# Patient Record
Sex: MALE | Employment: OTHER | URBAN - METROPOLITAN AREA
[De-identification: names, ages, dates, MRNs, and addresses within clinical notes are randomized per-mention and may not be internally consistent; named-entity substitution may affect disease eponyms.]

---

## 2022-01-12 ENCOUNTER — HOSPITAL ENCOUNTER (EMERGENCY)
Age: 62
Discharge: HOME | End: 2022-01-12
Attending: EMERGENCY MEDICINE
Payer: OTHER GOVERNMENT

## 2022-01-12 ENCOUNTER — APPOINTMENT (OUTPATIENT)
Dept: CT IMAGING | Age: 62
End: 2022-01-12
Payer: OTHER GOVERNMENT

## 2022-01-12 VITALS
RESPIRATION RATE: 19 BRPM | BODY MASS INDEX: 23.9 KG/M2 | OXYGEN SATURATION: 99 % | TEMPERATURE: 98.8 F | WEIGHT: 140 LBS | SYSTOLIC BLOOD PRESSURE: 138 MMHG | DIASTOLIC BLOOD PRESSURE: 87 MMHG | HEIGHT: 64 IN | HEART RATE: 86 BPM

## 2022-01-12 DIAGNOSIS — N20.0 KIDNEY STONE: Primary | ICD-10-CM

## 2022-01-12 LAB
ALBUMIN SERPL-MCNC: 5.2 G/DL (ref 3.5–5.2)
ALP BLD-CCNC: 69 U/L (ref 40–129)
ALT SERPL-CCNC: 16 U/L (ref 0–40)
ANION GAP SERPL CALCULATED.3IONS-SCNC: 14 MMOL/L (ref 7–16)
AST SERPL-CCNC: 21 U/L (ref 0–39)
BACTERIA: ABNORMAL /HPF
BASOPHILS ABSOLUTE: 0.11 E9/L (ref 0–0.2)
BASOPHILS RELATIVE PERCENT: 0.6 % (ref 0–2)
BILIRUB SERPL-MCNC: 0.5 MG/DL (ref 0–1.2)
BILIRUBIN URINE: NEGATIVE
BLOOD, URINE: ABNORMAL
BUN BLDV-MCNC: 15 MG/DL (ref 6–23)
CALCIUM SERPL-MCNC: 10 MG/DL (ref 8.6–10.2)
CHLORIDE BLD-SCNC: 105 MMOL/L (ref 98–107)
CLARITY: CLEAR
CO2: 25 MMOL/L (ref 22–29)
COLOR: YELLOW
CREAT SERPL-MCNC: 1 MG/DL (ref 0.7–1.2)
EOSINOPHILS ABSOLUTE: 0.17 E9/L (ref 0.05–0.5)
EOSINOPHILS RELATIVE PERCENT: 1 % (ref 0–6)
GFR AFRICAN AMERICAN: >60
GFR NON-AFRICAN AMERICAN: >60 ML/MIN/1.73
GLUCOSE BLD-MCNC: 159 MG/DL (ref 74–99)
GLUCOSE URINE: NEGATIVE MG/DL
HCT VFR BLD CALC: 49.9 % (ref 37–54)
HEMOGLOBIN: 16.9 G/DL (ref 12.5–16.5)
IMMATURE GRANULOCYTES #: 0.1 E9/L
IMMATURE GRANULOCYTES %: 0.6 % (ref 0–5)
KETONES, URINE: NEGATIVE MG/DL
LEUKOCYTE ESTERASE, URINE: ABNORMAL
LIPASE: 37 U/L (ref 13–60)
LYMPHOCYTES ABSOLUTE: 1.39 E9/L (ref 1.5–4)
LYMPHOCYTES RELATIVE PERCENT: 8 % (ref 20–42)
MCH RBC QN AUTO: 29.4 PG (ref 26–35)
MCHC RBC AUTO-ENTMCNC: 33.9 % (ref 32–34.5)
MCV RBC AUTO: 86.9 FL (ref 80–99.9)
MONOCYTES ABSOLUTE: 0.72 E9/L (ref 0.1–0.95)
MONOCYTES RELATIVE PERCENT: 4.1 % (ref 2–12)
NEUTROPHILS ABSOLUTE: 14.86 E9/L (ref 1.8–7.3)
NEUTROPHILS RELATIVE PERCENT: 85.7 % (ref 43–80)
NITRITE, URINE: NEGATIVE
PDW BLD-RTO: 12.6 FL (ref 11.5–15)
PH UA: 6 (ref 5–9)
PLATELET # BLD: 269 E9/L (ref 130–450)
PMV BLD AUTO: 9.3 FL (ref 7–12)
POTASSIUM SERPL-SCNC: 4.3 MMOL/L (ref 3.5–5)
PROTEIN UA: NEGATIVE MG/DL
RBC # BLD: 5.74 E12/L (ref 3.8–5.8)
RBC UA: ABNORMAL /HPF (ref 0–2)
REASON FOR REJECTION: NORMAL
REJECTED TEST: NORMAL
SODIUM BLD-SCNC: 144 MMOL/L (ref 132–146)
SPECIFIC GRAVITY UA: >=1.03 (ref 1–1.03)
TOTAL PROTEIN: 8.3 G/DL (ref 6.4–8.3)
UROBILINOGEN, URINE: 0.2 E.U./DL
WBC # BLD: 17.4 E9/L (ref 4.5–11.5)
WBC UA: ABNORMAL /HPF (ref 0–5)

## 2022-01-12 PROCEDURE — 80053 COMPREHEN METABOLIC PANEL: CPT

## 2022-01-12 PROCEDURE — 6360000002 HC RX W HCPCS

## 2022-01-12 PROCEDURE — 74178 CT ABD&PLV WO CNTR FLWD CNTR: CPT

## 2022-01-12 PROCEDURE — 83690 ASSAY OF LIPASE: CPT

## 2022-01-12 PROCEDURE — 85025 COMPLETE CBC W/AUTO DIFF WBC: CPT

## 2022-01-12 PROCEDURE — 2580000003 HC RX 258: Performed by: RADIOLOGY

## 2022-01-12 PROCEDURE — 99284 EMERGENCY DEPT VISIT MOD MDM: CPT

## 2022-01-12 PROCEDURE — 6360000004 HC RX CONTRAST MEDICATION: Performed by: RADIOLOGY

## 2022-01-12 PROCEDURE — 6360000002 HC RX W HCPCS: Performed by: EMERGENCY MEDICINE

## 2022-01-12 PROCEDURE — 96375 TX/PRO/DX INJ NEW DRUG ADDON: CPT

## 2022-01-12 PROCEDURE — 96374 THER/PROPH/DIAG INJ IV PUSH: CPT

## 2022-01-12 PROCEDURE — 81001 URINALYSIS AUTO W/SCOPE: CPT

## 2022-01-12 RX ORDER — TAMSULOSIN HYDROCHLORIDE 0.4 MG/1
0.4 CAPSULE ORAL DAILY
Qty: 14 CAPSULE | Refills: 0 | Status: SHIPPED | OUTPATIENT
Start: 2022-01-12

## 2022-01-12 RX ORDER — MORPHINE SULFATE 2 MG/ML
4 INJECTION, SOLUTION INTRAMUSCULAR; INTRAVENOUS ONCE
Status: COMPLETED | OUTPATIENT
Start: 2022-01-12 | End: 2022-01-12

## 2022-01-12 RX ORDER — KETOROLAC TROMETHAMINE 30 MG/ML
15 INJECTION, SOLUTION INTRAMUSCULAR; INTRAVENOUS ONCE
Status: COMPLETED | OUTPATIENT
Start: 2022-01-12 | End: 2022-01-12

## 2022-01-12 RX ORDER — SODIUM CHLORIDE 0.9 % (FLUSH) 0.9 %
10 SYRINGE (ML) INJECTION
Status: COMPLETED | OUTPATIENT
Start: 2022-01-12 | End: 2022-01-12

## 2022-01-12 RX ORDER — HYDROCODONE BITARTRATE AND ACETAMINOPHEN 5; 325 MG/1; MG/1
1 TABLET ORAL EVERY 6 HOURS PRN
Qty: 12 TABLET | Refills: 0 | Status: SHIPPED | OUTPATIENT
Start: 2022-01-12 | End: 2022-01-15

## 2022-01-12 RX ORDER — ONDANSETRON 2 MG/ML
INJECTION INTRAMUSCULAR; INTRAVENOUS
Status: COMPLETED
Start: 2022-01-12 | End: 2022-01-12

## 2022-01-12 RX ORDER — IBUPROFEN 600 MG/1
600 TABLET ORAL 3 TIMES DAILY PRN
Qty: 30 TABLET | Refills: 0 | Status: SHIPPED | OUTPATIENT
Start: 2022-01-12 | End: 2022-02-26

## 2022-01-12 RX ADMIN — ONDANSETRON HYDROCHLORIDE: 2 SOLUTION INTRAMUSCULAR; INTRAVENOUS at 11:30

## 2022-01-12 RX ADMIN — IOPAMIDOL 90 ML: 755 INJECTION, SOLUTION INTRAVENOUS at 13:57

## 2022-01-12 RX ADMIN — KETOROLAC TROMETHAMINE 15 MG: 30 INJECTION, SOLUTION INTRAMUSCULAR at 12:01

## 2022-01-12 RX ADMIN — Medication 10 ML: at 13:57

## 2022-01-12 RX ADMIN — MORPHINE SULFATE 4 MG: 2 INJECTION, SOLUTION INTRAMUSCULAR; INTRAVENOUS at 12:02

## 2022-01-12 ASSESSMENT — PAIN DESCRIPTION - PAIN TYPE
TYPE: ACUTE PAIN
TYPE: ACUTE PAIN

## 2022-01-12 ASSESSMENT — PAIN DESCRIPTION - DESCRIPTORS: DESCRIPTORS: SHARP

## 2022-01-12 ASSESSMENT — ENCOUNTER SYMPTOMS
VOMITING: 0
SORE THROAT: 0
SINUS PRESSURE: 0
DIARRHEA: 0
EYE DISCHARGE: 0
WHEEZING: 0
EYE REDNESS: 0
SHORTNESS OF BREATH: 0
BACK PAIN: 0
NAUSEA: 0
COUGH: 0
ABDOMINAL PAIN: 1
EYE PAIN: 0

## 2022-01-12 ASSESSMENT — PAIN SCALES - GENERAL
PAINLEVEL_OUTOF10: 10
PAINLEVEL_OUTOF10: 0
PAINLEVEL_OUTOF10: 10
PAINLEVEL_OUTOF10: 10

## 2022-01-12 ASSESSMENT — PAIN DESCRIPTION - LOCATION
LOCATION: ABDOMEN
LOCATION: ABDOMEN

## 2022-01-12 ASSESSMENT — PAIN DESCRIPTION - ONSET: ONSET: SUDDEN

## 2022-01-12 ASSESSMENT — PAIN DESCRIPTION - PROGRESSION: CLINICAL_PROGRESSION: GRADUALLY WORSENING

## 2022-01-12 ASSESSMENT — PAIN DESCRIPTION - ORIENTATION
ORIENTATION: RIGHT
ORIENTATION: LOWER

## 2022-01-12 ASSESSMENT — PAIN DESCRIPTION - FREQUENCY: FREQUENCY: CONTINUOUS

## 2022-01-12 NOTE — ED PROVIDER NOTES
Patient presents with right-sided abdominal pain. Patient states it began approximately 1 to 2 hours ago. He rates his pain as a 10 out of 10. Patient states that it feels as if he has to go to the bathroom but he states that he has been unable to do so. Patient states that the pain has been constant since then. He has never had this pain before. He denies any other accompanying symptoms including but not limited to fevers, chills, chest pain, shortness of breath, nausea, vomiting, or blood in his stool. The history is provided by the patient. No  was used. Review of Systems   Constitutional: Negative for chills and fever. HENT: Negative for ear pain, sinus pressure and sore throat. Eyes: Negative for pain, discharge and redness. Respiratory: Negative for cough, shortness of breath and wheezing. Cardiovascular: Negative for chest pain. Gastrointestinal: Positive for abdominal pain. Negative for diarrhea, nausea and vomiting. Genitourinary: Positive for flank pain. Negative for dysuria and frequency. Musculoskeletal: Negative for arthralgias and back pain. Skin: Negative for rash and wound. Neurological: Negative for weakness and headaches. Hematological: Negative for adenopathy. All other systems reviewed and are negative. Physical Exam  Vitals and nursing note reviewed. Constitutional:       General: He is in acute distress. Appearance: He is well-developed. HENT:      Head: Normocephalic and atraumatic. Eyes:      Conjunctiva/sclera: Conjunctivae normal.   Cardiovascular:      Rate and Rhythm: Normal rate and regular rhythm. Heart sounds: Normal heart sounds. No murmur heard. Pulmonary:      Effort: Pulmonary effort is normal. No respiratory distress. Breath sounds: Normal breath sounds. No wheezing or rales. Abdominal:      General: Abdomen is flat. Bowel sounds are normal. There is no distension.       Palpations: Abdomen is soft. Tenderness: There is no abdominal tenderness. There is no right CVA tenderness, left CVA tenderness, guarding or rebound. Musculoskeletal:         General: No tenderness or deformity. Cervical back: Normal range of motion and neck supple. Skin:     General: Skin is warm and dry. Neurological:      General: No focal deficit present. Mental Status: He is alert and oriented to person, place, and time. Cranial Nerves: No cranial nerve deficit. Coordination: Coordination normal.          Procedures     MDM  Number of Diagnoses or Management Options  Kidney stone  Diagnosis management comments: Patient presents with abdominal pain. Morphine and Toradol were given for pain. Zofran given for nausea ultrasound was performed and did not show any obvious abdominal aortic aneurysm. There was hydronephrosis on the right. Urinalysis did show blood. There there was trace leukocyte esterase in his urine likely secondary to inflammatory reaction and as opposed to infection. Leukocytosis is also likely secondary to stress response. CT of the abdomen pelvis confirmed hydronephrosis of the right kidney and no abdominal aortic aneurysm. Patient's vitals are stable at this point. He is asymptomatic at this time. Patient will be discharged with a prescription for Norco, ibuprofen, and Flomax and will be advised to follow-up with his PCP and urology for further evaluation and care of his kidney stone. He was informed the results and plan and is agreeable. Patient discharged home. Amount and/or Complexity of Data Reviewed  Clinical lab tests: reviewed  Tests in the radiology section of CPT®: reviewed                    --------------------------------------------- PAST HISTORY ---------------------------------------------  Past Medical History:  has a past medical history of Asthma, Gout, and Hyperlipidemia.     Past Surgical History:  has a past surgical history that includes Monocytes Absolute 0.72 0.10 - 0.95 E9/L    Eosinophils Absolute 0.17 0.05 - 0.50 E9/L    Basophils Absolute 0.11 0.00 - 0.20 E9/L   SPECIMEN REJECTION   Result Value Ref Range    Rejected Test CBCWD     Reason for Rejection see below    Comprehensive Metabolic Panel   Result Value Ref Range    Sodium 144 132 - 146 mmol/L    Potassium 4.3 3.5 - 5.0 mmol/L    Chloride 105 98 - 107 mmol/L    CO2 25 22 - 29 mmol/L    Anion Gap 14 7 - 16 mmol/L    Glucose 159 (H) 74 - 99 mg/dL    BUN 15 6 - 23 mg/dL    CREATININE 1.0 0.7 - 1.2 mg/dL    GFR Non-African American >60 >=60 mL/min/1.73    GFR African American >60     Calcium 10.0 8.6 - 10.2 mg/dL    Total Protein 8.3 6.4 - 8.3 g/dL    Albumin 5.2 3.5 - 5.2 g/dL    Total Bilirubin 0.5 0.0 - 1.2 mg/dL    Alkaline Phosphatase 69 40 - 129 U/L    ALT 16 0 - 40 U/L    AST 21 0 - 39 U/L       Radiology:  CT ABDOMEN PELVIS W WO CONTRAST Additional Contrast? None   Final Result   1. Mild right HYDROURETERONEPHROSIS related to a 5 mm calculus at the   ureterovesicular junction. 2. Questionable apple-core lesion (CONCERNING FOR MALIGNANCY) in the proximal   transverse colon versus artifact of transient spasm. Further evaluation with   colonoscopy recommended. 3. No evidence of metastatic disease. 4. Evidence of      RECOMMENDATIONS:   Unavailable             ------------------------- NURSING NOTES AND VITALS REVIEWED ---------------------------  Date / Time Roomed:  1/12/2022 10:33 AM  ED Bed Assignment:  WASHBURN B/SERGEY    The nursing notes within the ED encounter and vital signs as below have been reviewed.    /87   Pulse 86   Temp 98.8 °F (37.1 °C)   Resp 19   Ht 5' 4\" (1.626 m)   Wt 140 lb (63.5 kg)   SpO2 99%   BMI 24.03 kg/m²   Oxygen Saturation Interpretation: Normal      ------------------------------------------ PROGRESS NOTES ------------------------------------------  7:55 PM EST  I have spoken with the patient and discussed todays results, in addition to providing specific details for the plan of care and counseling regarding the diagnosis and prognosis. Their questions are answered at this time and they are agreeable with the plan. I discussed at length with them reasons for immediate return here for re evaluation. They will followup with their urologist and primary care physician by calling their office tomorrow. --------------------------------- ADDITIONAL PROVIDER NOTES ---------------------------------  At this time the patient is without objective evidence of an acute process requiring hospitalization or inpatient management. They have remained hemodynamically stable throughout their entire ED visit and are stable for discharge with outpatient follow-up. The plan has been discussed in detail and they are aware of the specific conditions for emergent return, as well as the importance of follow-up. Discharge Medication List as of 1/12/2022  4:14 PM      START taking these medications    Details   HYDROcodone-acetaminophen (NORCO) 5-325 MG per tablet Take 1 tablet by mouth every 6 hours as needed for Pain for up to 3 days. Intended supply: 3 days. Take lowest dose possible to manage pain, Disp-12 tablet, R-0Print      tamsulosin (FLOMAX) 0.4 MG capsule Take 1 capsule by mouth daily, Disp-14 capsule, R-0Print      ibuprofen (ADVIL;MOTRIN) 600 MG tablet Take 1 tablet by mouth 3 times daily as needed for Pain, Disp-30 tablet, R-0Print             Diagnosis:  1. Kidney stone        Disposition:  Patient's disposition: Discharge to home  Patient's condition is stable.     Patient was seen and evaluated by both myself and Denice Blue MD.           Colt Ascencio, DO  Resident  01/12/22 0082

## 2022-01-12 NOTE — ED TRIAGE NOTES
Department of Emergency Medicine  FIRST PROVIDER TRIAGE NOTE             Independent MLP           1/12/22  10:29 AM EST    Date of Encounter: 1/12/22   MRN: [id number]      HPI: Ada Chaudhary is a 64 y.o. male who presents to the ED for Abdominal Pain (Right side abd pain radiating into groin started this am.  Denies hematuria, nausea or vomiting. One episode of diarrhea this am)   abdominal pain x 1.5 hours. RLQ and radiates to the groin. No back pain. No urinary symptoms. Denies N/V/D.     ROS: Negative for cp, sob, fever, vomiting or diarrhea. PE: Gen Appearance/Constitutional: alert  CV: regular rate  Pulm: CTA bilat  GI: tender to palpation to RLQ and suprapubic area, no rebound, mild guarding but abdomen is soft. Initial Plan of Care: All treatment areas with department are currently occupied. Plan to order/Initiate the following while awaiting opening in ED: labs and imaging studies.     Initial Plan of Care: Initiate Treatment-Testing, Proceed toTreatment Area When Bed Available for ED Attending/MLP to Continue Care    Electronically signed by Marylee Joseph, PA   DD: 1/12/22

## 2022-01-12 NOTE — ED NOTES
Bed:   Expected date:   Expected time:   Means of arrival:   Comments:  triage     Ramez Simmons RN  01/12/22 0909

## 2022-02-26 ENCOUNTER — APPOINTMENT (OUTPATIENT)
Dept: CT IMAGING | Age: 62
End: 2022-02-26
Payer: OTHER GOVERNMENT

## 2022-02-26 ENCOUNTER — HOSPITAL ENCOUNTER (EMERGENCY)
Age: 62
Discharge: HOME | End: 2022-02-26
Payer: OTHER GOVERNMENT

## 2022-02-26 VITALS
BODY MASS INDEX: 23.05 KG/M2 | WEIGHT: 135 LBS | RESPIRATION RATE: 14 BRPM | TEMPERATURE: 98.5 F | HEIGHT: 64 IN | DIASTOLIC BLOOD PRESSURE: 76 MMHG | OXYGEN SATURATION: 99 % | SYSTOLIC BLOOD PRESSURE: 122 MMHG | HEART RATE: 96 BPM

## 2022-02-26 DIAGNOSIS — N20.1 RIGHT URETERAL STONE: Primary | ICD-10-CM

## 2022-02-26 LAB
ALBUMIN SERPL-MCNC: 5.2 G/DL (ref 3.5–5.2)
ALP BLD-CCNC: 67 U/L (ref 40–129)
ALT SERPL-CCNC: 18 U/L (ref 0–40)
ANION GAP SERPL CALCULATED.3IONS-SCNC: 14 MMOL/L (ref 7–16)
AST SERPL-CCNC: 24 U/L (ref 0–39)
BACTERIA: NORMAL /HPF
BASOPHILS ABSOLUTE: 0.05 E9/L (ref 0–0.2)
BASOPHILS RELATIVE PERCENT: 0.4 % (ref 0–2)
BILIRUB SERPL-MCNC: 0.4 MG/DL (ref 0–1.2)
BILIRUBIN URINE: NEGATIVE
BLOOD, URINE: ABNORMAL
BUN BLDV-MCNC: 15 MG/DL (ref 6–23)
CALCIUM SERPL-MCNC: 9.8 MG/DL (ref 8.6–10.2)
CHLORIDE BLD-SCNC: 100 MMOL/L (ref 98–107)
CLARITY: CLEAR
CO2: 24 MMOL/L (ref 22–29)
COLOR: YELLOW
CREAT SERPL-MCNC: 1 MG/DL (ref 0.7–1.2)
EOSINOPHILS ABSOLUTE: 0.03 E9/L (ref 0.05–0.5)
EOSINOPHILS RELATIVE PERCENT: 0.2 % (ref 0–6)
GFR AFRICAN AMERICAN: >60
GFR NON-AFRICAN AMERICAN: >60 ML/MIN/1.73
GLUCOSE BLD-MCNC: 140 MG/DL (ref 74–99)
GLUCOSE URINE: NEGATIVE MG/DL
HCT VFR BLD CALC: 42.6 % (ref 37–54)
HEMOGLOBIN: 14.6 G/DL (ref 12.5–16.5)
IMMATURE GRANULOCYTES #: 0.09 E9/L
IMMATURE GRANULOCYTES %: 0.6 % (ref 0–5)
KETONES, URINE: ABNORMAL MG/DL
LACTIC ACID: 2.5 MMOL/L (ref 0.5–2.2)
LEUKOCYTE ESTERASE, URINE: NEGATIVE
LYMPHOCYTES ABSOLUTE: 0.68 E9/L (ref 1.5–4)
LYMPHOCYTES RELATIVE PERCENT: 4.9 % (ref 20–42)
MCH RBC QN AUTO: 30 PG (ref 26–35)
MCHC RBC AUTO-ENTMCNC: 34.3 % (ref 32–34.5)
MCV RBC AUTO: 87.5 FL (ref 80–99.9)
MONOCYTES ABSOLUTE: 0.53 E9/L (ref 0.1–0.95)
MONOCYTES RELATIVE PERCENT: 3.8 % (ref 2–12)
NEUTROPHILS ABSOLUTE: 12.58 E9/L (ref 1.8–7.3)
NEUTROPHILS RELATIVE PERCENT: 90.1 % (ref 43–80)
NITRITE, URINE: NEGATIVE
PDW BLD-RTO: 12.6 FL (ref 11.5–15)
PH UA: 6.5 (ref 5–9)
PLATELET # BLD: 198 E9/L (ref 130–450)
PMV BLD AUTO: 8.6 FL (ref 7–12)
POTASSIUM SERPL-SCNC: 4.1 MMOL/L (ref 3.5–5)
PROTEIN UA: NEGATIVE MG/DL
RBC # BLD: 4.87 E12/L (ref 3.8–5.8)
RBC UA: NORMAL /HPF (ref 0–2)
REASON FOR REJECTION: NORMAL
REJECTED TEST: NORMAL
SODIUM BLD-SCNC: 138 MMOL/L (ref 132–146)
SPECIFIC GRAVITY UA: 1.01 (ref 1–1.03)
TOTAL PROTEIN: 8.3 G/DL (ref 6.4–8.3)
UROBILINOGEN, URINE: 0.2 E.U./DL
WBC # BLD: 14 E9/L (ref 4.5–11.5)
WBC UA: NORMAL /HPF (ref 0–5)

## 2022-02-26 PROCEDURE — 80053 COMPREHEN METABOLIC PANEL: CPT

## 2022-02-26 PROCEDURE — 83605 ASSAY OF LACTIC ACID: CPT

## 2022-02-26 PROCEDURE — 81001 URINALYSIS AUTO W/SCOPE: CPT

## 2022-02-26 PROCEDURE — 6360000004 HC RX CONTRAST MEDICATION: Performed by: RADIOLOGY

## 2022-02-26 PROCEDURE — 6360000002 HC RX W HCPCS: Performed by: PHYSICIAN ASSISTANT

## 2022-02-26 PROCEDURE — 85025 COMPLETE CBC W/AUTO DIFF WBC: CPT

## 2022-02-26 PROCEDURE — 2580000003 HC RX 258: Performed by: PHYSICIAN ASSISTANT

## 2022-02-26 PROCEDURE — 74177 CT ABD & PELVIS W/CONTRAST: CPT

## 2022-02-26 PROCEDURE — 96374 THER/PROPH/DIAG INJ IV PUSH: CPT

## 2022-02-26 PROCEDURE — 99284 EMERGENCY DEPT VISIT MOD MDM: CPT

## 2022-02-26 PROCEDURE — 36415 COLL VENOUS BLD VENIPUNCTURE: CPT

## 2022-02-26 PROCEDURE — 96375 TX/PRO/DX INJ NEW DRUG ADDON: CPT

## 2022-02-26 RX ORDER — ONDANSETRON 4 MG/1
4 TABLET, ORALLY DISINTEGRATING ORAL EVERY 8 HOURS PRN
Qty: 10 TABLET | Refills: 0 | Status: SHIPPED | OUTPATIENT
Start: 2022-02-26

## 2022-02-26 RX ORDER — ONDANSETRON 2 MG/ML
4 INJECTION INTRAMUSCULAR; INTRAVENOUS ONCE
Status: COMPLETED | OUTPATIENT
Start: 2022-02-26 | End: 2022-02-26

## 2022-02-26 RX ORDER — KETOROLAC TROMETHAMINE 10 MG/1
10 TABLET, FILM COATED ORAL EVERY 8 HOURS PRN
Qty: 20 TABLET | Refills: 0 | Status: SHIPPED | OUTPATIENT
Start: 2022-02-26

## 2022-02-26 RX ORDER — 0.9 % SODIUM CHLORIDE 0.9 %
1000 INTRAVENOUS SOLUTION INTRAVENOUS ONCE
Status: COMPLETED | OUTPATIENT
Start: 2022-02-26 | End: 2022-02-26

## 2022-02-26 RX ORDER — KETOROLAC TROMETHAMINE 30 MG/ML
15 INJECTION, SOLUTION INTRAMUSCULAR; INTRAVENOUS ONCE
Status: COMPLETED | OUTPATIENT
Start: 2022-02-26 | End: 2022-02-26

## 2022-02-26 RX ADMIN — IOPAMIDOL 90 ML: 755 INJECTION, SOLUTION INTRAVENOUS at 15:10

## 2022-02-26 RX ADMIN — SODIUM CHLORIDE 1000 ML: 9 INJECTION, SOLUTION INTRAVENOUS at 13:26

## 2022-02-26 RX ADMIN — ONDANSETRON 4 MG: 2 INJECTION INTRAMUSCULAR; INTRAVENOUS at 13:26

## 2022-02-26 RX ADMIN — KETOROLAC TROMETHAMINE 15 MG: 30 INJECTION, SOLUTION INTRAMUSCULAR at 13:26

## 2022-02-26 ASSESSMENT — PAIN SCALES - GENERAL: PAINLEVEL_OUTOF10: 7

## 2022-02-26 NOTE — ED NOTES
IV in patient's left arm is suitable for CT with contrast per Dr Marcellus Cochran, RN  02/26/22 [phone]

## 2022-02-26 NOTE — ED PROVIDER NOTES
[facility]  Department of Emergency Medicine   ED  Encounter Note  Admit Date/RoomTime: 2022  1:02 PM  ED Room:     NAME: David Alicea  : 1960  MRN: [id number]     Chief Complaint:  Flank Pain (bilateral flank pain and difficulty urinating this morning. reports being seen here and diaignosed with a kidney stone last month that he passed at home)    History of Present Illness        David Alicea is a 64 y.o. old male who presents to the emergency department by private vehicle, for bilateral flank and right lower abdominal pain starting this morning. Patient states is associated with nausea and vomiting, decreased urination. Patient states his symptoms are mild in severity describes as an aching, sharp pain. Patient denies anything making it better or worse. Patient states he was diagnosed with a kidney stone 1 month ago and is unsure if he passed it. Patient states he did not follow-up with urology. Denies fever/chills, headache, vision change, dizziness, cough, hemoptysis, chest pain, dyspnea, diarrhea, hematemesis, melena, bloody stool, dysuria, hematuria, numbness/weakness. ROS   Pertinent positives and negatives are stated within HPI, all other systems reviewed and are negative. Past Medical History:  has a past medical history of Asthma, Gout, and Hyperlipidemia. Surgical History:  has a past surgical history that includes Colonoscopy. Social History:  reports that he has been smoking cigars. He has never used smokeless tobacco. He reports current alcohol use. He reports that he does not use drugs. Family History: family history is not on file.      Allergies: Pcn [penicillins]    Physical Exam   Oxygen Saturation Interpretation: Normal.        ED Triage Vitals   BP Temp Temp src Pulse Resp SpO2 Height Weight   22 1301 22 1301 -- 22 1258 22 1301 22 1258 22 1301 22 1301   (!) 167/89 98.5 °F (36.9 °C)  66 18 98 % 5' 4\" (1.626 m) 135 lb (61.2 kg)       Physical Exam  General Appearance/Constitutional:  Alert, development consistent with age. HEENT:  NC/NT. PERRLA. Airway patent. Neck:  Supple. No lymphadenopathy. Respiratory: Lungs Clear to auscultation and breath sounds equal.  CV:  Regular rate and rhythm. GI:  normal appearing, non-distended with no visible hernias. Bowel sounds: normal bowel sounds. Tenderness: There is mild tenderness present - located in the RLQ and in the right flank. .           Liver: non-tender. Spleen:  non-tender. Back: CVA Tenderness: No CVA tenderness. Integument:  Normal turgor. Warm, dry, without visible rash, unless noted elsewhere. Neurological:  Orientation age-appropriate. Motor functions intact.     Lab / Imaging Results   (All laboratory and radiology results have been personally reviewed by myself)  Labs:  Results for orders placed or performed during the hospital encounter of 02/26/22   Comprehensive Metabolic Panel   Result Value Ref Range    Sodium 138 132 - 146 mmol/L    Potassium 4.1 3.5 - 5.0 mmol/L    Chloride 100 98 - 107 mmol/L    CO2 24 22 - 29 mmol/L    Anion Gap 14 7 - 16 mmol/L    Glucose 140 (H) 74 - 99 mg/dL    BUN 15 6 - 23 mg/dL    CREATININE 1.0 0.7 - 1.2 mg/dL    GFR Non-African American >60 >=60 mL/min/1.73    GFR African American >60     Calcium 9.8 8.6 - 10.2 mg/dL    Total Protein 8.3 6.4 - 8.3 g/dL    Albumin 5.2 3.5 - 5.2 g/dL    Total Bilirubin 0.4 0.0 - 1.2 mg/dL    Alkaline Phosphatase 67 40 - 129 U/L    ALT 18 0 - 40 U/L    AST 24 0 - 39 U/L   Urinalysis   Result Value Ref Range    Color, UA Yellow Straw/Yellow    Clarity, UA Clear Clear    Glucose, Ur Negative Negative mg/dL    Bilirubin Urine Negative Negative    Ketones, Urine TRACE (A) Negative mg/dL    Specific Gravity, UA 1.015 1.005 - 1.030    Blood, Urine TRACE-INTACT Negative    pH, UA 6.5 5.0 - 9.0 Protein, UA Negative Negative mg/dL    Urobilinogen, Urine 0.2 <2.0 E.U./dL    Nitrite, Urine Negative Negative    Leukocyte Esterase, Urine Negative Negative   Lactic Acid   Result Value Ref Range    Lactic Acid 2.5 (H) 0.5 - 2.2 mmol/L   CBC with Auto Differential   Result Value Ref Range    WBC 14.0 (H) 4.5 - 11.5 E9/L    RBC 4.87 3.80 - 5.80 E12/L    Hemoglobin 14.6 12.5 - 16.5 g/dL    Hematocrit 42.6 37.0 - 54.0 %    MCV 87.5 80.0 - 99.9 fL    MCH 30.0 26.0 - 35.0 pg    MCHC 34.3 32.0 - 34.5 %    RDW 12.6 11.5 - 15.0 fL    Platelets 196 423 - 577 E9/L    MPV 8.6 7.0 - 12.0 fL    Neutrophils % 90.1 (H) 43.0 - 80.0 %    Immature Granulocytes % 0.6 0.0 - 5.0 %    Lymphocytes % 4.9 (L) 20.0 - 42.0 %    Monocytes % 3.8 2.0 - 12.0 %    Eosinophils % 0.2 0.0 - 6.0 %    Basophils % 0.4 0.0 - 2.0 %    Neutrophils Absolute 12.58 (H) 1.80 - 7.30 E9/L    Immature Granulocytes # 0.09 E9/L    Lymphocytes Absolute 0.68 (L) 1.50 - 4.00 E9/L    Monocytes Absolute 0.53 0.10 - 0.95 E9/L    Eosinophils Absolute 0.03 (L) 0.05 - 0.50 E9/L    Basophils Absolute 0.05 0.00 - 0.20 E9/L   SPECIMEN REJECTION   Result Value Ref Range    Rejected Test CBCWD     Reason for Rejection see below    Microscopic Urinalysis   Result Value Ref Range    WBC, UA NONE 0 - 5 /HPF    RBC, UA NONE 0 - 2 /HPF    Bacteria, UA NONE SEEN None Seen /HPF     Imaging: All Radiology results interpreted by Radiologist unless otherwise noted. CT ABDOMEN PELVIS W IV CONTRAST Additional Contrast? None   Final Result   1. At least partially obstructing 0.3 cm calculus in the distal right   ureter, as described above. 2.  Mild fatty infiltration of the liver again seen, when compared to the   previous study performed 01/12/2022.      3.  Bilateral renal cortical cysts again noted.              ED Course / Medical Decision Making     Medications   ketorolac (TORADOL) injection 15 mg (15 mg IntraVENous Given 2/26/22 1326)   0.9 % sodium chloride bolus (0 mLs IntraVENous Stopped 2/26/22 1542)   ondansetron (ZOFRAN) injection 4 mg (4 mg IntraVENous Given 2/26/22 1326)   iopamidol (ISOVUE-370) 76 % injection 90 mL (90 mLs IntraVENous Given 2/26/22 1510)     ED Course as of 02/26/22 1719   Sat Feb 26, 2022   1409 Patient pain and nausea improved [KL]      ED Course User Index  [KL] NENO Coello       Consultations:             None    Procedures:   none    MDM: Patient presenting with right flank pain. Patient is in no acute distress, afebrile, nontoxic appearance. Patient's lactic is 2.5-patient given fluids. Patient's WBC is slightly elevated. Patient CT showing a 0.3 cm right distal ureter stone. Patient CT also showing fatty liver and bilateral renal cyst which was on previous CT and patient was nformed. Patient feeling better after medications given here. Patient has Norco and Flomax at home. Patient also be sent with Toradol and Zofran to take as needed. Recommend patient follow-up with PCP and urology. Recommend patient return to the ED with new or worsening of symptoms. Plan of Care/Counseling:  NENO Coello  reviewed today's visit with the patient in addition to providing specific details for the plan of care and counseling regarding the diagnosis and prognosis. Questions are answered at this time and are agreeable with the plan. Assessment      1. Right ureteral stone      This patient's ED course included: a personal history and physicial examination  This patient has remained hemodynamically stable during their ED course. Plan   Discharged home  Patient condition is stable.     New Medications     New Prescriptions    KETOROLAC (TORADOL) 10 MG TABLET    Take 1 tablet by mouth every 8 hours as needed for Pain Had IV in ED    ONDANSETRON (ZOFRAN ODT) 4 MG DISINTEGRATING TABLET    Take 1 tablet by mouth every 8 hours as needed for Nausea or Vomiting     Electronically signed by NENO Coello   DD: 2/26/22  **This report was transcribed using voice recognition software. Every effort was made to ensure accuracy; however, inadvertent computerized transcription errors may be present.   END OF PROVIDER NOTE     Link Marquez PA-C  02/26/22 [id number]

## 2024-01-27 ENCOUNTER — APPOINTMENT (OUTPATIENT)
Dept: GENERAL RADIOLOGY | Age: 64
End: 2024-01-27
Payer: OTHER GOVERNMENT

## 2024-01-27 ENCOUNTER — HOSPITAL ENCOUNTER (EMERGENCY)
Age: 64
Discharge: HOME | End: 2024-01-27
Payer: OTHER GOVERNMENT

## 2024-01-27 VITALS
DIASTOLIC BLOOD PRESSURE: 90 MMHG | TEMPERATURE: 97.6 F | WEIGHT: 140 LBS | BODY MASS INDEX: 23.9 KG/M2 | SYSTOLIC BLOOD PRESSURE: 146 MMHG | OXYGEN SATURATION: 97 % | HEART RATE: 81 BPM | HEIGHT: 64 IN | RESPIRATION RATE: 16 BRPM

## 2024-01-27 DIAGNOSIS — S61.219A LACERATION OF FINGER OF RIGHT HAND WITHOUT FOREIGN BODY WITHOUT DAMAGE TO NAIL, UNSPECIFIED FINGER, INITIAL ENCOUNTER: Primary | ICD-10-CM

## 2024-01-27 PROCEDURE — 12001 RPR S/N/AX/GEN/TRNK 2.5CM/<: CPT

## 2024-01-27 PROCEDURE — 99284 EMERGENCY DEPT VISIT MOD MDM: CPT

## 2024-01-27 PROCEDURE — 73130 X-RAY EXAM OF HAND: CPT

## 2024-01-27 PROCEDURE — 6360000002 HC RX W HCPCS: Performed by: NURSE PRACTITIONER

## 2024-01-27 PROCEDURE — 90471 IMMUNIZATION ADMIN: CPT | Performed by: NURSE PRACTITIONER

## 2024-01-27 PROCEDURE — 90714 TD VACC NO PRESV 7 YRS+ IM: CPT | Performed by: NURSE PRACTITIONER

## 2024-01-27 RX ORDER — LIDOCAINE HYDROCHLORIDE 10 MG/ML
5 INJECTION, SOLUTION INFILTRATION; PERINEURAL ONCE
Status: DISCONTINUED | OUTPATIENT
Start: 2024-01-27 | End: 2024-01-27 | Stop reason: HOSPADM

## 2024-01-27 RX ORDER — BACITRACIN ZINC 500 [USP'U]/G
OINTMENT TOPICAL ONCE
Status: DISCONTINUED | OUTPATIENT
Start: 2024-01-27 | End: 2024-01-27 | Stop reason: HOSPADM

## 2024-01-27 RX ORDER — TETANUS AND DIPHTHERIA TOXOIDS ADSORBED 2; 2 [LF]/.5ML; [LF]/.5ML
0.5 INJECTION INTRAMUSCULAR ONCE
Status: COMPLETED | OUTPATIENT
Start: 2024-01-27 | End: 2024-01-27

## 2024-01-27 RX ADMIN — TETANUS AND DIPHTHERIA TOXOIDS ADSORBED 0.5 ML: 2; 2 INJECTION INTRAMUSCULAR at 20:21

## 2024-01-27 ASSESSMENT — PAIN - FUNCTIONAL ASSESSMENT: PAIN_FUNCTIONAL_ASSESSMENT: 0-10

## 2024-01-27 ASSESSMENT — PAIN SCALES - GENERAL: PAINLEVEL_OUTOF10: 2

## 2024-01-27 NOTE — ED PROVIDER NOTES
[facility] EMERGENCY DEPARTMENT  ED  Encounter Note  Admit Date/RoomTime: 1/27/2024  6:50 PM  ED Room: [place] [address]    Department of Emergency Medicine  ED Provider Note  Admit Date: 1/27/2024  Room: [place] [address]/      Independent       HPI:  1/27/24, Time: 6:51 PM AMOR Stokes is a 63 y.o. old male presenting to the emergency department for a laceration to the right hand pinky and middle finger, caused by blunt object, which occured to hours prior to arrival. There is not a possibility of retained foreign body in the affected area.  The patients tetanus status is unknown.   Bleeding is controlled. There is no pain at injury site. The injury was not work related.  Patient presents to the emergency department with multiple lacerations to the right hand, states that he was holding heavy electric equipment and tripped and fell but did not want to drop the equipment causing the equipment to land on his hand hitting it against a wall.  Patient reports attempting a dressing but the area has continued to still bleed.  States that he did this around 4 PM.  He denies unusual numbness or tingling and does have full movement both flexion and extension.  Unaware when his last tetanus immunization was.      Review of Systems:   Pertinent positives and negatives are stated within HPI, all other systems reviewed and are negative.          --------------------------------------------- PAST HISTORY ---------------------------------------------  Past Medical History:  has a past medical history of Asthma, Gout, and Hyperlipidemia.    Past Surgical History:  has a past surgical history that includes Colonoscopy.    Social History:  reports that he has quit smoking. His smoking use included cigars. He has never used smokeless tobacco. He reports current alcohol use. He reports that he does not use drugs.    Family History: family history is not on file.     The patient’s home  MAKING----------------------  Medications   lidocaine 1 % injection 5 mL (has no administration in time range)   bacitracin zinc ointment (has no administration in time range)   diptheria-tetanus toxoids (TDVAX) 2-2 LF/0.5ML injection 0.5 mL (0.5 mLs IntraMUSCular Given 1/27/24 2021)             LACERATION REPAIR  PROCEDURE NOTE:  Unless otherwise indicated, this procedure was performed by Julita Smith CNP       Laceration #: 1.  Location: 1  Length: 1cm.  The wound area was irrigated with sterile saline, cleansend with shur-clens and draped in a sterile fashion.  The wound area was anesthetized with Lidocaine 1% without epinephrine.  WOUND COMPLEXITY:    Debridement: partial thickness and None.  Undermining: partial thickness.  Wound Margins Revised: yes.  Flaps Aligned: yes.  The wound was explored with the following results No foreign bodies found, no foreign body or tendon injury seen.  The wound was closed with 4-0 Prolene using interrupted sutures.  Dressing:  bacitracin and a sterile dressing was placed.    Total number suture: 2    PROCEDURE NOTE  1/27/24       Time: 1845    LACERATION REPAIR  Risks, benefits and alternatives (for applicable procedures below) described.   Performed By: DEANNA Vera CNP.  Informed consent: Verbal consent obtained.  The patient was counseled regarding the procedure in person, it's indications, risks, potential complications and alternatives and any questions were answered. Verbal consent was obtained.    Laceration #: 2.  Location: Right hand pinky lateral aspect  Length: 1.5 cm.  The wound area was irrigated with sterile saline and draped in a sterile fashion.  Local Anesthesia:  obtained with Lidocaine 1% without epinephrine.  The wound was explored with the following results:    Thickness: partial thickness. No foreign bodies found, no foreign body or tendon injury seen.  Debridement: partial thickness.  Undermining: partial thickness.  Wound Margins Revised:

## 2024-01-28 NOTE — DISCHARGE INSTRUCTIONS
Cleanse the laceration site daily, avoid direct water contact.  Apply bacitracin ointment after cleansing it with a warm Dial soap and water.  Do not use peroxide or alcohol.  Look for signs symptoms of infection.  Plan on suture removal by your primary care doctor in 10 days.  Avoid excess movement/bending of fingers.